# Patient Record
Sex: FEMALE | Race: WHITE | NOT HISPANIC OR LATINO | Employment: UNEMPLOYED | URBAN - METROPOLITAN AREA
[De-identification: names, ages, dates, MRNs, and addresses within clinical notes are randomized per-mention and may not be internally consistent; named-entity substitution may affect disease eponyms.]

---

## 2021-08-25 ENCOUNTER — HOSPITAL ENCOUNTER (OUTPATIENT)
Facility: MEDICAL CENTER | Age: 41
End: 2021-08-25
Attending: PHYSICIAN ASSISTANT
Payer: COMMERCIAL

## 2021-08-25 ENCOUNTER — OFFICE VISIT (OUTPATIENT)
Dept: URGENT CARE | Facility: CLINIC | Age: 41
End: 2021-08-25
Payer: COMMERCIAL

## 2021-08-25 VITALS
TEMPERATURE: 98.4 F | HEIGHT: 59 IN | BODY MASS INDEX: 23.59 KG/M2 | DIASTOLIC BLOOD PRESSURE: 74 MMHG | OXYGEN SATURATION: 98 % | WEIGHT: 117 LBS | SYSTOLIC BLOOD PRESSURE: 100 MMHG | HEART RATE: 88 BPM | RESPIRATION RATE: 16 BRPM

## 2021-08-25 DIAGNOSIS — N30.00 ACUTE CYSTITIS WITHOUT HEMATURIA: ICD-10-CM

## 2021-08-25 DIAGNOSIS — N89.8 VAGINAL DISCHARGE: ICD-10-CM

## 2021-08-25 DIAGNOSIS — R30.0 DYSURIA: ICD-10-CM

## 2021-08-25 LAB
APPEARANCE UR: CLEAR
BILIRUB UR STRIP-MCNC: NEGATIVE MG/DL
COLOR UR AUTO: YELLOW
GLUCOSE UR STRIP.AUTO-MCNC: NEGATIVE MG/DL
INT CON NEG: NEGATIVE
INT CON POS: POSITIVE
KETONES UR STRIP.AUTO-MCNC: NEGATIVE MG/DL
LEUKOCYTE ESTERASE UR QL STRIP.AUTO: NEGATIVE
NITRITE UR QL STRIP.AUTO: NEGATIVE
PH UR STRIP.AUTO: 6 [PH] (ref 5–8)
POC URINE PREGNANCY TEST: NEGATIVE
PROT UR QL STRIP: NEGATIVE MG/DL
RBC UR QL AUTO: NORMAL
SP GR UR STRIP.AUTO: 1.01
UROBILINOGEN UR STRIP-MCNC: 0.2 MG/DL

## 2021-08-25 PROCEDURE — 99204 OFFICE O/P NEW MOD 45 MIN: CPT | Performed by: PHYSICIAN ASSISTANT

## 2021-08-25 PROCEDURE — 81002 URINALYSIS NONAUTO W/O SCOPE: CPT | Performed by: PHYSICIAN ASSISTANT

## 2021-08-25 PROCEDURE — 87491 CHLMYD TRACH DNA AMP PROBE: CPT

## 2021-08-25 PROCEDURE — 87480 CANDIDA DNA DIR PROBE: CPT

## 2021-08-25 PROCEDURE — 87510 GARDNER VAG DNA DIR PROBE: CPT

## 2021-08-25 PROCEDURE — 81025 URINE PREGNANCY TEST: CPT | Performed by: PHYSICIAN ASSISTANT

## 2021-08-25 PROCEDURE — 87660 TRICHOMONAS VAGIN DIR PROBE: CPT

## 2021-08-25 PROCEDURE — 87591 N.GONORRHOEAE DNA AMP PROB: CPT

## 2021-08-25 RX ORDER — NITROFURANTOIN 25; 75 MG/1; MG/1
100 CAPSULE ORAL EVERY 12 HOURS
Qty: 10 CAPSULE | Refills: 0 | Status: SHIPPED | OUTPATIENT
Start: 2021-08-25 | End: 2021-08-30

## 2021-08-25 ASSESSMENT — ENCOUNTER SYMPTOMS
NAUSEA: 0
RESPIRATORY NEGATIVE: 1
CHILLS: 0
ABDOMINAL PAIN: 1
DIARRHEA: 0
CARDIOVASCULAR NEGATIVE: 1
FLANK PAIN: 0
FEVER: 0
VOMITING: 0
HEADACHES: 1
MYALGIAS: 0

## 2021-08-25 NOTE — LETTER
August 25, 2021         Patient: Gisele Jenkins   YOB: 1980   Date of Visit: 8/25/2021           To Whom it May Concern:    Gisele Jenkins was seen in my clinic on 8/25/2021.  Please excuse any absences related to this appointment.    If you have any questions or concerns, please don't hesitate to call.        Sincerely,           Sung Carreon P.A.-C.  Electronically Signed

## 2021-08-25 NOTE — LETTER
August 25, 2021         Patient: Gisele Jenkins   YOB: 1980   Date of Visit: 8/25/2021           To Whom it May Concern:    Gisele Jenkins was seen in my clinic on 8/25/2021. Please excuse her absence.    If you have any questions or concerns, please don't hesitate to call.        Sincerely,           Sung Carreon P.A.-C.  Electronically Signed

## 2021-08-25 NOTE — PROGRESS NOTES
"Subjective     Gisele Jenkins is a 41 y.o. female who presents with UTI (x3days, abdominal pain, flank pain, burning with urination)            Dysuria   This is a new problem. The current episode started in the past 7 days. The problem occurs every urination. The problem has been gradually worsening. The quality of the pain is described as burning. There has been no fever. The fever has been present for less than 1 day. There is no history of pyelonephritis. Associated symptoms include a discharge, frequency and urgency. Pertinent negatives include no chills, flank pain, hematuria, nausea or vomiting. She has tried NSAIDs for the symptoms. The treatment provided mild relief. There is no history of recurrent UTIs.       PMH:  has no past medical history on file.  MEDS: No current outpatient medications on file.  ALLERGIES: No Known Allergies  SURGHX: No past surgical history on file.  SOCHX:  reports that she has never smoked. She has never used smokeless tobacco.  FH: family history is not on file.    Review of Systems   Constitutional: Negative for chills and fever.   Respiratory: Negative.    Cardiovascular: Negative.    Gastrointestinal: Positive for abdominal pain. Negative for diarrhea, nausea and vomiting.   Genitourinary: Positive for dysuria, frequency and urgency. Negative for flank pain and hematuria.   Musculoskeletal: Negative for myalgias.   Neurological: Positive for headaches.       Medications, Allergies, and current problem list reviewed today in Epic           Objective     /74   Pulse 88   Temp 36.9 °C (98.4 °F) (Temporal)   Resp 16   Ht 1.5 m (4' 11.06\")   Wt 53.1 kg (117 lb)   SpO2 98%   BMI 23.59 kg/m²      Physical Exam  Vitals and nursing note reviewed.   Constitutional:       General: She is not in acute distress.     Appearance: She is well-developed. She is not diaphoretic.   HENT:      Head: Normocephalic and atraumatic.   Eyes:      Conjunctiva/sclera: Conjunctivae " normal.   Cardiovascular:      Rate and Rhythm: Normal rate and regular rhythm.      Heart sounds: Normal heart sounds.   Pulmonary:      Effort: Pulmonary effort is normal. No respiratory distress.      Breath sounds: Normal breath sounds. No wheezing, rhonchi or rales.   Abdominal:      General: Abdomen is flat. There is no distension.      Tenderness: There is abdominal tenderness (Suprapubic pressure). There is no right CVA tenderness, left CVA tenderness, guarding or rebound.   Genitourinary:     Comments: Defer.  Will self swab  Musculoskeletal:      Cervical back: Normal range of motion and neck supple.      Left lower leg: No edema.   Skin:     General: Skin is warm and dry.   Neurological:      Mental Status: She is alert and oriented to person, place, and time.   Psychiatric:         Behavior: Behavior normal.         Thought Content: Thought content normal.         Judgment: Judgment normal.                     Assessment & Plan         1. Vaginal discharge  VAGINAL PATHOGENS DNA PANEL    CHLAMYDIA/GC PCR URINE OR SWAB   2. Acute cystitis without hematuria  nitrofurantoin (MACROBID) 100 MG Cap    CANCELED: Urine Culture   3. Dysuria  POCT Urinalysis    POCT PREGNANCY     Dysuria, frequency, urgency.  Denies fever, vomiting, flank pain.  Some lower abdominal pain.  Also having vaginal discharge however no itching or irritation.  Vital signs normal.  Exam shows suprapubic pressure without rebound or guarding.  No CVA tenderness patient be treated for acute UTI while we await the results of her vaginal testing.  OTC meds and conservative measures as discussed    Return to clinic or go to ED if symptoms worsen or persist. Indications for ED discussed at length. Patient/Parent/Guardian voices understanding. Follow-up with your primary care provider in 3-5 days. Red flag symptoms discussed. All side effects of medication discussed including allergic response, GI upset, tendon injury, rash, sedation  etc.    Please note that this dictation was created using voice recognition software. I have made every reasonable attempt to correct obvious errors, but I expect that there are errors of grammar and possibly content that I did not discover before finalizing the note.

## 2021-08-26 DIAGNOSIS — N89.8 VAGINAL DISCHARGE: ICD-10-CM

## 2021-08-26 DIAGNOSIS — B96.89 BV (BACTERIAL VAGINOSIS): ICD-10-CM

## 2021-08-26 DIAGNOSIS — N76.0 BV (BACTERIAL VAGINOSIS): ICD-10-CM

## 2021-08-26 LAB
C TRACH DNA SPEC QL NAA+PROBE: NEGATIVE
CANDIDA DNA VAG QL PROBE+SIG AMP: NEGATIVE
G VAGINALIS DNA VAG QL PROBE+SIG AMP: POSITIVE
N GONORRHOEA DNA SPEC QL NAA+PROBE: NEGATIVE
SPECIMEN SOURCE: NORMAL
T VAGINALIS DNA VAG QL PROBE+SIG AMP: NEGATIVE

## 2021-08-26 RX ORDER — METRONIDAZOLE 500 MG/1
500 TABLET ORAL 2 TIMES DAILY
Qty: 14 TABLET | Refills: 0 | Status: SHIPPED | OUTPATIENT
Start: 2021-08-26

## 2021-09-26 ENCOUNTER — OCCUPATIONAL MEDICINE (OUTPATIENT)
Dept: URGENT CARE | Facility: CLINIC | Age: 41
End: 2021-09-26
Payer: COMMERCIAL

## 2021-09-26 VITALS
WEIGHT: 127 LBS | OXYGEN SATURATION: 99 % | DIASTOLIC BLOOD PRESSURE: 78 MMHG | RESPIRATION RATE: 16 BRPM | HEIGHT: 57 IN | BODY MASS INDEX: 27.4 KG/M2 | TEMPERATURE: 97.4 F | SYSTOLIC BLOOD PRESSURE: 120 MMHG | HEART RATE: 72 BPM

## 2021-09-26 DIAGNOSIS — S46.912A MUSCLE STRAIN OF LEFT SHOULDER REGION, INITIAL ENCOUNTER: ICD-10-CM
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
## 2021-09-26 DIAGNOSIS — S16.1XXA STRAIN OF FASCIA OF NECK: ICD-10-CM

## 2021-09-26 DIAGNOSIS — S46.911A MUSCLE STRAIN OF SHOULDER REGION, RIGHT, INITIAL ENCOUNTER: ICD-10-CM

## 2021-09-26 LAB
AMP AMPHETAMINE: NORMAL
BAR BARBITURATES: NORMAL
BREATH ALCOHOL COMMENT: NORMAL
BZO BENZODIAZEPINES: NORMAL
COC COCAINE: NORMAL
INT CON NEG: NORMAL
INT CON POS: NORMAL
MDMA ECSTASY: NORMAL
MET METHAMPHETAMINES: NORMAL
MTD METHADONE: NORMAL
OPI OPIATES: NORMAL
OXY OXYCODONE: NORMAL
PCP PHENCYCLIDINE: NORMAL
POC BREATHALIZER: 0 PERCENT (ref 0–0.01)
POC URINE DRUG SCREEN OCDRS: NEGATIVE
THC: NORMAL

## 2021-09-26 PROCEDURE — 99203 OFFICE O/P NEW LOW 30 MIN: CPT | Performed by: NURSE PRACTITIONER

## 2021-09-26 PROCEDURE — 82075 ASSAY OF BREATH ETHANOL: CPT | Performed by: NURSE PRACTITIONER

## 2021-09-26 PROCEDURE — 80305 DRUG TEST PRSMV DIR OPT OBS: CPT | Performed by: NURSE PRACTITIONER

## 2021-09-26 RX ORDER — KETOROLAC TROMETHAMINE 30 MG/ML
30 INJECTION, SOLUTION INTRAMUSCULAR; INTRAVENOUS ONCE
Status: COMPLETED | OUTPATIENT
Start: 2021-09-26 | End: 2021-09-26

## 2021-09-26 RX ADMIN — KETOROLAC TROMETHAMINE 30 MG: 30 INJECTION, SOLUTION INTRAMUSCULAR; INTRAVENOUS at 18:17

## 2021-09-26 ASSESSMENT — ENCOUNTER SYMPTOMS
FALLS: 0
CHILLS: 1
NAUSEA: 0
VOMITING: 0
TINGLING: 0
SENSORY CHANGE: 0
FOCAL WEAKNESS: 1
ABDOMINAL PAIN: 0
BACK PAIN: 0
MYALGIAS: 1
NECK PAIN: 1
DIARRHEA: 0

## 2021-09-26 NOTE — LETTER
September 26, 2021    To Whom It May Concern:         This is confirmation that Gisele Giraldo attended her scheduled appointment with OMARI Goodman on 9/26/21. Please excuse her absence from work on 9/27/2021 due to an acute injury.          If you have any questions please do not hesitate to call me at the phone number listed below.    Sincerely,          LUCIO Goodman.  [phone]

## 2021-09-26 NOTE — LETTER
[place] [address] [place] [address]  Phone:  [phone] - Fax:  [phone]   [facility] Progress Report and Disability Certification  Date of Service: 9/26/2021   No Show:  No  Date / Time of Next Visit: 9/29/2021 @ 9 am   Claim Information   Patient Name: Gisele Giraldo  Claim Number:     Employer: [employer]  Date of Injury: 9/26/2021     Insurer / TPA: [employer]  ID / SSN:     Occupation: House keeping  Diagnosis: Diagnoses of Muscle strain of left shoulder region, initial encounter, Muscle strain of shoulder region, right, initial encounter, Work related injury, and Strain of fascia of neck were pertinent to this visit.    Medical Information   Related to Industrial Injury? Yes    Subjective Complaints:  Neck Pain   This is a new (DOI 9/26/2021) problem. 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 She notes her pain occurred while spreading a sheet while making a bed. Her pain progressively worsened throughout the day. ). The problem has been unchanged. The quality of the pain is described as aching. The pain is severe (She notes worsened pain with sitting up straight and standing). Pertinent negatives include no tingling. Associated symptoms comments: Radiating pain to neck. She has tried ice and NSAIDs for the symptoms. The treatment provided moderate relief.   She denies any other employment. She did have an injury to a tendon in her left upper back 3 years ago. This does not feel similar to her previous injury. She does continue to have pain from this previous injury that she medicates with Ibuprofen when needed.    Objective Findings: Musculoskeletal:      Cervical back: Neck supple. No signs of trauma, torticollis or tenderness. Pain with movement, spinous process tenderness and muscular tenderness present. Decreased range of motion.   Bilateral  upper extremity strength 4/5.   Pre-Existing Condition(s): Previous left upper back strain 3 years ago.   Assessment:   Initial Visit    Status: Additional Care Required  Permanent Disability:No    Plan: Medication  Comments:Toradol, Tylenol    Diagnostics:      Comments:       Disability Information   Status:      From:  9/26/2021  Through: 9/29/2021 Restrictions are: Temporary   Physical Restrictions   Sitting:    Standing:    Stooping:    Bending:      Squatting:    Walking:    Climbing:    Pushing:      Pulling:    Other:    Reaching Above Shoulder (L):   Reaching Above Shoulder (R):       Reaching Below Shoulder (L):    Reaching Below Shoulder (R):      Not to exceed Weight Limits   Carrying(hrs):   Weight Limit(lb):   Lifting(hrs):   Weight  Limit(lb):     Comments: Toradol injection given in clinic today.  She was educated to refrain from additional NSAIDs for the next 48 hours following the use of this medication.  Tylenol may be used every 4 hours as needed for additional pain relief.  We discussed the importance of massage, heat, ice and rest.  Due to her severe pain, she will take tomorrow off work and follow-up in the clinic in 3 days.  No work restrictions provided at today's visit but she does not return to work until Thursday.  AVS handout given and reviewed with patient. Pt educated on red flags and when to seek treatment back in ER or UC.     Repetitive Actions   Hands: i.e. Fine Manipulations from Grasping:     Feet: i.e. Operating Foot Controls:     Driving / Operate Machinery:     Health Care Provider’s Original or Electronic Signature  OMARI Goodman Health Care Provider’s Original or Electronic Signature    David Orozco MD         Clinic Name / Location: [address] [place] [address] Clinic Phone Number: Dept: [phone]   Appointment Time: 3:15 Pm Visit Start Time: 5:47 PM   Check-In Time:  3:16 Pm Visit Discharge Time:  629 pm     Original-Treating Physician or Chiropractor    Page 2-Insurer/TPA    Page 3-Employer    Page 4-Employee

## 2021-09-26 NOTE — LETTER
"EMPLOYEE’S CLAIM FOR COMPENSATION/ REPORT OF INITIAL TREATMENT  FORM C-4    EMPLOYEE’S CLAIM - PROVIDE ALL INFORMATION REQUESTED   First Name  Gisele Last Name  Enzo Birthdate                    5/22/1979                Sex  female Claim Number (Insurer’s Use Only)    Home Address  [address] Age  42 y.o. 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 (if applicable)  zo abdalla aura cama    How did this injury or occupational disease occur? (Be specific an answer in detail. Use additional sheet if necessary)  Poniendo rm montgomerya cama de un cuarto   If you believe that you have an occupational disease, when did you first have knowledge of the disability and it relationship to your employment?  n/a Witnesses to the Accident  n/a      Nature of Injury or Occupational Disease  Sprain  Part(s) of Body Injured or Affected  Soft Tissue - Neck, ,     I certify that the above is true and correct to the best of my knowledge and that I have provided this information in order to obtain the " benefits of [place]  I hereby authorize any physician, chiropractor, surgeon, practitioner, or other person, any hospital, including [facility] or [facility], any medical service organization, any insurance company, or other institution or organization to release to each other, any medical or other information, including benefits paid or payable, pertinent to this injury or disease, except information relative to diagnosis, treatment and/or counseling for AIDS, psychological conditions, alcohol or controlled substances, for which I must give specific authorization.  A Photostat of this authorization shall be as valid as the original.     Date   Place Employee’s Original or  *Electronic Signature   THIS REPORT MUST BE COMPLETED AND MAILED WITHIN 3 WORKING DAYS OF TREATMENT   Place  [facility]  Name of [facility]   Date  9/26/2021 Diagnosis and Description of Injury or Occupational Disease  (S46.912A) Muscle strain of left shoulder region, initial encounter  (S46.911A) Muscle strain of shoulder region, right, initial encounter  (Y99.0) Work related injury  (S16.1XXA) Strain of fascia of neck Is there evidence the injured employee was under the influence of alcohol and/or another controlled substance at the time of accident?  ? No ? Yes (if yes, please explain)    Hour  5:47 PM   Diagnoses of Muscle strain of left shoulder region, initial encounter, Muscle strain of shoulder region, right, initial encounter, Work related injury, and Strain of fascia of neck were pertinent to this visit. No   Treatment  Rest, ice, heat, massage and over-the-counter Tylenol.   Have you advised the patient to remain off work five days or     more?    X-Ray Findings      ? Yes Indicate dates:   From   To      From information given by the employee, together with medical evidence, can         "you directly connect this injury or occupational disease as job incurred?  Yes  Comments:Unknown ? No If no, is the injured employee capable of:  ? full duty  No ? modified duty  No   Is additional medical care by a physician indicated?  Yes If Modified Duty, Specify any Limitations / Restrictions      Do you know of any previous injury or disease contributing to this condition or occupational disease?  ? Yes ? No (Explain if yes)                          Yes  Comments:Previous left upper back strain   Date  9/26/2021 Print Health Care Provider's   OMARI Goodman I certify the employer’s copy of  this form was mailed on:   Address  [address] [address] Insurer’s Use Only     [facility] Zip  [address]    Provider’s Tax ID Number  [id number] Telephone  Dept: [phone]             Health Care Provider’s Original or Electronic Signature  e-REINA Mccormick Degree (MD,DO, DC,PA-C,APRN)   APRN      * Complete and attach Release of Information (Form C-4A) when injured employee signs C-4 Form electronically  ORIGINAL - TREATING HEALTHCARE PROVIDER PAGE 2 - INSURER/TPA PAGE 3 - EMPLOYER PAGE 4 - EMPLOYEE             Form C-4 (rev.08/21)           BRIEF DESCRIPTION OF RIGHTS AND BENEFITS  (Pursuant to [place] 616C.050)    Notice of Injury or Occupational Disease (Incident Report Form C-1): If an injury or occupational disease (OD) arises out of and in the course of employment, you must provide written notice to your employer as soon as practicable, but no later than 7 days after the accident or OD. Your employer shall maintain a sufficient supply of the required forms.    Claim for Compensation (Form C-4): If medical treatment is sought, the form C-4 is available at the place of initial treatment. A completed \"Claim for Compensation\" (Form C-4) must be filed within 90 days after an accident or OD. The treating physician or chiropractor must, within 3 working days after treatment, complete and " mail to the employer, the employer's insurer and third-party , the Claim for Compensation.    Medical Treatment: If you require medical treatment for your on-the-job injury or OD, you may be required to select a physician or chiropractor from a list provided by your workers’ compensation insurer, if it has contracted with an Organization for Managed Care (MCO) or Preferred Provider Organization (PPO) or providers of health care. If your employer has not entered into a contract with an MCO or PPO, you may select a physician or chiropractor from the Panel of Physicians and Chiropractors. Any medical costs related to your industrial injury or OD will be paid by your insurer.    Temporary Total Disability (TTD): If your doctor has certified that you are unable to work for a period of at least 5 consecutive days, or 5 cumulative days in a 20-day period, or places restrictions on you that your employer does not accommodate, you may be entitled to TTD compensation.    Temporary Partial Disability (TPD): If the wage you receive upon reemployment is less than the compensation for TTD to which you are entitled, the insurer may be required to pay you TPD compensation to make up the difference. TPD can only be paid for a maximum of 24 months.    Permanent Partial Disability (PPD): When your medical condition is stable and there is an indication of a PPD as a result of your injury or OD, within 30 days, your insurer must arrange for an evaluation by a rating physician or chiropractor to determine the degree of your PPD. The amount of your PPD award depends on the date of injury, the results of the PPD evaluation, your age and wage.    Permanent Total Disability (PTD): If you are medically certified by a treating physician or chiropractor as permanently and totally disabled and have been granted a PTD status by your insurer, you are entitled to receive monthly benefits not to exceed 66 2/3% of your average monthly  wage. The amount of your PTD payments is subject to reduction if you previously received a lump-sum PPD award.    Vocational Rehabilitation Services: You may be eligible for vocational rehabilitation services if you are unable to return to the job due to a permanent physical impairment or permanent restrictions as a result of your injury or occupational disease.    Transportation and Per Heber Reimbursement: You may be eligible for travel expenses and per heber associated with medical treatment.    Reopening: You may be able to reopen your claim if your condition worsens after claim closure.     Appeal Process: If you disagree with a written determination issued by the insurer or the insurer does not respond to your request, you may appeal to the Department of Administration, , by following the instructions contained in your determination letter. You must appeal the determination within 70 days from the date of the determination letter at [address], [address], [place], [place] [address], or [address] [place], [place] [address]. If you disagree with the  decision, you may appeal to the Department of Administration, . You must file your appeal within 30 days from the date of the  decision letter at [address], [address], [place], [place] [address], or [address] [place], [place] [address]. If you disagree with a decision of an , you may file a petition for judicial review with the District Court. You must do so within 30 days of the Appeal Officer’s decision. You may be represented by an  at your own expense or you may contact the [facility] for possible representation.    [place]  for Injured Workers (NAIW): If you disagree with a  decision, you may request that NAIW represent you without charge at an  Hearing. For information regarding denial of  benefits, you may contact the [facility] at: [address], [address], [place] [address], [phone]) [phone], or [address] [facility], [address], [place] [address], [phone]    To File a Complaint with the Division: If you wish to file a complaint with the  of the Division of Industrial Relations (DIR),  please contact the Workers’ Compensation Section, [address], [address], [place], [place] [address], telephone ([phone]) [phone], or [address], [place], [place] [address], telephone [phone].    For assistance with Workers’ Compensation Issues: You may contact the [facility] Office for Consumer Health Assistance, [address], [address], [place], [place] [address], Toll Free 1-575.250.1137, Web site: http://[facility].nv.[facility]/Programs/KATHIE E-mail: [email or web address].[email or web address]              __________________________________________________________________                                    _________________            Employee Name / Signature                                                                                                                            Date                                                                                                                                                                                                                              D-2 (rev. 10/20)

## 2021-09-27 NOTE — PATIENT INSTRUCTIONS
Ketorolac injection  ¿Qué es kaden medicamento?  El QUETOROLAC es un medicamento antiinflamatorio no esteroideo (LUZ). Se utiliza para tratar los lana moderados a severos hasta 5 días. Se utiliza comúnmente después de viki cirugía. No debe utilizarlo alessandro más de 5 días.  Kaden medicamento puede ser utilizado para otros usos; si tiene alguna pregunta consulte con amin proveedor de atención médica o con amin farmacéutico.  MARCAS COMUNES: Toradol  ¿Qué le wild informar a mi profesional de la uma antes de noelle kaden medicamento?  Necesitan saber si usted presenta alguno de los siguientes problemas o situaciones:  asma, especialmente asma sensible a la aspirina problemas de sangrado cirugía de bypass coronario con injerto en las últimas 2 semanas enfermedad renal sangrado estomacal, úlcera, u otro problema anthony aspirina, otros LUZ, o probenecida viki reacción alérgica o inusual al ketorolaco, a la trometamina, a la aspirina, a otros LUZ, a otros medicamentos, alimentos, colorantes o conservantes si está embarazada o buscando quedar embarazada si está amamantando a un bebé  ¿Cómo wild utilizar kaden medicamento?  Kaden medicamento se administra mediante inyección en un músculo o en viki vena. Lo administra un profesional de la uma en un hospital o en un entorno clínico.  Hable con amin pediatra para informarse acerca del uso de kaden medicamento en niños. Puede requerir atención especial.  Los pacientes de más de 65 años de edad pueden presentar reacciones más barbara y necesitar dosis menores.  Sobredosis: Póngase en contacto inmediatamente con un centro toxicológico o viki charles de urgencia si usted du que haya tomado demasiado medicamento.  ATENCIÓN: Kaden medicamento es solo para usted. No comparta kaden medicamento con nadie.  ¿Qué sucede si me olvido de viki dosis?  No se aplica en kaden nga.  ¿Qué puede interactuar con kaden medicamento?  No tome esta medicina con ninguno de los siguientes medicamentos:  · aspirina y  medicamentos tipo aspirina  · cidofovir  · metotrexato  · los LUZ, medicamentos para el dolor o inflamación, jeferson ibuprofeno o naproxeno  · pentoxifilina  · probenecid  Esta medicina también puede interactuar con los siguientes medicamentos:  · alcohol  · alendronato  · alprazolam  · carbamazepina  · diuréticos  · flavocoxid  · fluoxetina  · ginkgo  · litio  · medicamentos para la presión sanguínea jeferson enalapril  · medicamentos que afectan las plaquetas jeferson pentoxifilina  · medicamentos que tratan o previenen coágulos sanguíneos, jeferson heparina, warfarina  · relajantes musculares  · pemetrexed  · fenitoína  · tiotixeno  Puede ser que esta lista no menciona todas las posibles interacciones. Informe a amin profesional de la uma de todos los productos a base de hierbas, medicamentos de venta jeannine o suplementos nutritivos que esté tomando. Si usted fuma, consume bebidas alcohólicas o si utiliza drogas ilegales, indíqueselo también a amin profesional de la uma. Algunas sustancias pueden interactuar con amin medicamento.  ¿A qué wild estar atento al usar kaden medicamento?  Si jimena síntomas no mejoran o si se siente peor, informe a amin médico o a amin profesional de la uma.  Kaden medicamento no previene ataques cardíacos o derrames cerebrales. De hecho, kaden medicamento puede aumentar la posibilidad de padecer un ataque cardiaco o un derrame cerebral. La posibilidad puede aumentar con el uso prolongado de kaden medicamento y en pacientes con enfermedad cardiaca. Si está tomando aspirina para la prevención de ataques cardíacos o derrames cerebrales, comuníquese con amin médico o amin profesional de la uma.  Evite noelle medicamentos, tales jeferson ibuprofeno y naproxeno con kaden medicamento. Es probable que se produzcan efectos secundarios, tales jeferson molestias estomacales, náuseas o úlceras. No debe de noelle kaden medicamento con muchos medicamentos disponibles de venta jeannine.  Kaden medicamento puede provocar úlceras y hemorragia  del estómago e intestinos en cualquier momento alessandro tratamiento. No fume ni ingiera alcohol. [place] irrita aún más el estómago y puede hacerlo más susceptible a daño por el uso de kaden medicamento. Pueden ocurrir úlceras y hemorragia sin síntomas de alerta y pueden provocar la muerte.  Kaden medicamento puede hacerle sangrar con mayor facilidad. Trate de no lastimarse los dientes y las encías al cepillarlos o limpiarlos con hilo dental.  ¿Qué efectos secundarios puedo tener al utilizar kaden medicamento?  Efectos secundarios que debe informar a amin médico o a amin profesional de la uma tan pronto jeferson sea posible:  reacciones alérgicas, jeferson erupción cutánea, comezón/picazón o urticarias, e hinchazón de la mary jane, los labios o la lengua problemas respiratorios presión sanguínea josep náuseas, vómito enrojecimiento, formación de ampollas, descamación o distensión de la piel, incluso dentro de la boca dolor estomacal severo signos y síntomas de sangrado, tales jeferson heces con jannet o de color jimenez y aspecto alquitranado; orina de color aquino o marrón oscuro; escupir jannet o material marrón que tiene el aspecto de granos de café molido; manchas pollock en la piel; sangrado o moretones inusuales en los ojos, las encías o la nariz signos y síntomas de un coágulo sanguíneo, tales jeferson cambios en la visión; dolor en el pecho; dolor de cathleen severo, repentino; dificultad para hablar; entumecimiento o debilidad repentina de la mary jane, el brazo o la pierna dificultad para orinar o cambios en el volumen de orina aumento de peso o hinchazón inexplicables cansancio o debilidad inusual color amarillento de los ojos o la piel  Efectos secundarios que generalmente no requieren atención médica (infórmelos a amin médico o a amin profesional de la uma si persisten o si son molestos):  diarrea mareos dolor de cathleen acidez estomacal  Puede ser que esta lista no menciona todos los posibles efectos secundarios. Comuníquese a amin médico por  asesoramiento médico sobre los efectos secundarios. Usted puede informar los efectos secundarios a la FDA por teléfono al 1-800-FDA-1088.  ¿Dónde wild guardar mi medicina?  [place] medicamento se administra en hospitales o clínicas y no necesitará guardarlo en amin domicilio.  ATENCIÓN: [place] folleto es un resumen. Puede ser que no cubra toda la posible información. Si usted tiene preguntas acerca de esta medicina, consulte con amin médico, amin farmacéutico o amin profesional de la uma.  © 2020 Elsevier[employer] (2018-10-11 00:00:00)  Distensión muscular.  Muscle Strain  Eda distensión muscular es eda lesión que se produce cuando un músculo se estira más allá de amin carina normal. Cuando esto sucede, por lo general, se desgarra eda pequeña cantidad de fibras musculares. Hay marleny tipos de distensiones musculares. Las distensiones de primer franca son aquellas en las cuales el desgarro afecta a la rocael cantidad de fibras musculares y las menos dolorosas. Las distensiones de milan y tercer franca involucran eda proporción cada vez mayor de desgarro y dolor.  En general, la recuperación de eda distensión muscular tarda de 1 a 2 semanas. La recuperación completa normalmente tarda de 5 a 6 semanas.  ¿Cuáles son las causas?  Esta afección ocurre cuando se aplica eda fuerza violenta y súbita sobre un músculo y éste se estira demasiado. [place] puede ocurrir alessandro eda caída, cuando se levantan objetos o cuando se practican deportes.  ¿Qué incrementa el riesgo?  Es más probable que esta afección se manifieste en atletas y personas físicamente activas.  ¿Cuáles son los signos o los síntomas?  Los síntomas de esta afección incluyen los siguientes:  · Dolor.  · Moretones.  · Hinchazón.  · Dificultad cuando se usa el músculo.  ¿Cómo se diagnostica?  Esta afección se diagnostica en función de un examen físico y de los antecedentes médicos. También se pueden hacer estudios jeferson radiografía, ecografía o resonancia magnética (RM).  ¿Cómo  se trata?  Inicialmente, se trata con terapia PRICE (protección, reposo, hielo, compresión, elevación). Esta terapia incluye lo siguiente:  · Proteger al músculo de nuevas lesiones.  · Reposo del músculo lesionado.  · Aplicación de hielo en el músculo lesionado.  · Aplicación de presión (compresión) en el músculo lesionado. [place] se puede hacer con viki férula o viki venda elástica.  · Elevación del músculo lesionado.  El médico también puede recomendarle analgésicos.  Siga estas indicaciones en amin casa:  Si tiene viki férula:  · Use la férula jeferson se lo haya indicado el médico. Quítesela solamente jeferson se lo haya indicado el médico.  · Afloje la férula si los dedos de las shell o de los pies se le entumecen, siente hormigueos o se le enfrían y se tornan de color amanda.  · Mantenga la férula limpia.  · Si la férula no es impermeable:  ? No deje que se moje.  ? Cúbrala con un envoltorio hermético cuando tome un baño de inmersión o viki ducha.  Control del dolor, de la rigidez y de la hinchazón    · Si se lo indican, aplique hielo sobre la flynn de la lesión.  ? Si tiene viki férula desmontable, quítesela jeferson se lo haya indicado el médico.  ? Ponga el hielo en viki bolsa plástica.  ? Coloque viki toalla entre la piel y la bolsa de hielo.  ? Coloque el hielo alessandro 20 minutos, de 2 a 3 veces por día.  · Mueva los dedos de la mano o del pie con frecuencia para evitar la rigidez y reducir la hinchazón.  · Cuando esté sentado o acostado, levante (eleve) la flynn lesionada por encima del nivel del corazón.  · Use viki venda elástica jeferson se lo haya indicado el médico. Asegúrese de no ajustarla demasiado.  Instrucciones generales  · Charlo los medicamentos de venta jeannine y los recetados solamente jeferson se lo haya indicado el médico.  · Restrinja las actividades y tesfaye reposo del músculo lesionado según las indicaciones del médico. Posiblemente le permitan hacer movimientos suaves.  · Si le indicaron fisioterapia, tesfaye los ejercicios  jeferson se lo haya indicado el médico.  · No ejerza presión en ninguna parte de la férula hasta que se haya endurecido por completo. [place] puede tardar varias horas.  · No consuma ningún producto que contenga nicotina o tabaco, jeferson cigarrillos y cigarrillos electrónicos. Estos pueden retrasar la consolidación del hueso. Si necesita ayuda para dejar de fumar, consulte al médico.  · Pregúntele al médico cuándo puede volver a conducir si tiene viki férula.  · Concurra a todas las visitas de control jeferson se lo haya indicado el médico. [place] es importante.  ¿Cómo se beny?  · Precaliente antes de la actividad física. [place] ayuda a prevenir futuras distensiones musculares.  Comuníquese con un médico si:  · Siente más dolor o tiene más hinchazón en la flynn lesionada.  Solicite ayuda de inmediato si:  · Siente adormecimiento, hormigueo o nota viki pérdida importante de fuerza en la flynn lesionada.  Resumen  · Viki distensión muscular es viki lesión que se produce cuando un músculo se estira más allá de amin carina normal.  · Esta afección ocurre cuando se aplica viki fuerza violenta y súbita sobre un músculo y éste se estira demasiado.  · Esta afección se trata inicialmente con terapia PRICE, que significa protección, reposo, hielo, compresión y elevación.  · Posiblemente le permitan hacer movimientos suaves. Si le indicaron fisioterapia, tesfaye los ejercicios jeferson se lo haya indicado el médico.  Esta información no tiene jeferson fin reemplazar el consejo del médico. Asegúrese de hacerle al médico cualquier pregunta que tenga.  Document Released: 09/27/2006 Document Revised: 09/17/2018 Document Reviewed: 09/17/2018  Elsevier Patient Education © 2020 Elsevier Inc.

## 2021-09-27 NOTE — PROGRESS NOTES
Subjective:     Gisele Giraldo is a 42 y.o. female who presents for Other (New WC DOI 09/26/2021, Pulling in shoulder muscle, as the day went forward pain got worse)      Neck Pain   This is a new (DOI 9/26/2021) problem. 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 She notes her pain occurred while spreading a sheet while making a bed. Her pain progressively worsened throughout the day. ). The problem has been unchanged. The quality of the pain is described as aching. The pain is severe (She notes worsened pain with sitting up straight and standing). Pertinent negatives include no tingling. Associated symptoms comments: Radiating pain to neck. She has tried ice and NSAIDs for the symptoms. The treatment provided moderate relief.   She denies any other employment. She did have an injury to a tendon in her left upper back 3 years ago. This does not feel similar to her previous injury. She does continue to have pain from this previous injury that she medicates with Ibuprofen when needed.       Review of Systems   Constitutional: Positive for chills.   Gastrointestinal: Negative for abdominal pain, diarrhea, nausea and vomiting.   Musculoskeletal: Positive for myalgias and neck pain. Negative for back pain, falls and joint pain.   Neurological: Positive for focal weakness. Negative for tingling and sensory change.       PMH: No past medical history on file.  ALLERGIES: No Known Allergies  SURGHX: No past surgical history on file.  SOCHX:   Social History     Socioeconomic History   • Marital status: Single     Spouse name: Not on file   • Number of children: Not on file   • Years of education: Not on file   • Highest education level: Not on file   Occupational History   • Not on file   Tobacco Use   • Smoking status: Never Smoker   • Smokeless tobacco: Never Used   Substance and Sexual Activity   • Alcohol use: Not on file   • Drug use: Not on file  "  • Sexual activity: Not on file   Other Topics Concern   • Not on file   Social History Narrative   • Not on file     Social Determinants of Health     Financial Resource Strain:    • Difficulty of Paying Living Expenses:    Food Insecurity:    • Worried About Running Out of Food in the Last Year:    • Ran Out of Food in the Last Year:    Transportation Needs:    • Lack of Transportation (Medical):    • Lack of Transportation (Non-Medical):    Physical Activity:    • Days of Exercise per Week:    • Minutes of Exercise per Session:    Stress:    • Feeling of Stress :    Social Connections:    • Frequency of Communication with Friends and Family:    • Frequency of Social Gatherings with Friends and Family:    • Attends Sabianist Services:    • Active Member of Clubs or Organizations:    • Attends Club or Organization Meetings:    • Marital Status:    Intimate Partner Violence:    • Fear of Current or Ex-Partner:    • Emotionally Abused:    • Physically Abused:    • Sexually Abused:      FH: No family history on file.      Objective:   /78   Pulse 72   Temp 36.3 °C (97.4 °F) (Temporal)   Resp 16   Ht 1.448 m (4' 9\")   Wt 57.6 kg (127 lb)   SpO2 99%   BMI 27.48 kg/m²     Physical Exam  Vitals and nursing note reviewed.   Constitutional:       General: She is not in acute distress.     Appearance: Normal appearance. She is not ill-appearing.   HENT:      Head: Normocephalic and atraumatic.      Right Ear: External ear normal.      Left Ear: External ear normal.      Nose: No congestion or rhinorrhea.      Mouth/Throat:      Mouth: Mucous membranes are moist.   Eyes:      Extraocular Movements: Extraocular movements intact.      Pupils: Pupils are equal, round, and reactive to light.   Neck:     Cardiovascular:      Rate and Rhythm: Normal rate and regular rhythm.      Pulses: Normal pulses.      Heart sounds: Normal heart sounds.   Pulmonary:      Effort: Pulmonary effort is normal.      Breath sounds: " Normal breath sounds.   Abdominal:      General: Abdomen is flat. Bowel sounds are normal.      Palpations: Abdomen is soft.      Tenderness: There is no abdominal tenderness. There is no right CVA tenderness or left CVA tenderness.   Musculoskeletal:      Cervical back: Neck supple. No signs of trauma, torticollis or tenderness. Pain with movement, spinous process tenderness and muscular tenderness present. Decreased range of motion.   Lymphadenopathy:      Cervical: No cervical adenopathy.   Skin:     General: Skin is warm and dry.      Capillary Refill: Capillary refill takes less than 2 seconds.   Neurological:      General: No focal deficit present.      Mental Status: She is alert and oriented to person, place, and time. Mental status is at baseline.   Psychiatric:         Mood and Affect: Mood normal.         Behavior: Behavior normal.         Thought Content: Thought content normal.         Judgment: Judgment normal.         Assessment/Plan:   Assessment    1. Muscle strain of left shoulder region, initial encounter  ketorolac (TORADOL) injection 30 mg   2. Muscle strain of shoulder region, right, initial encounter  ketorolac (TORADOL) injection 30 mg   3. Work related injury     4. Strain of fascia of neck       Toradol injection given in clinic today.  She tolerated this well.  She was educated to refrain from additional NSAIDs for the next 48 hours following the use of this medication.  Tylenol may be used every 4 hours as needed for additional pain relief.  We discussed the importance of massage, heat, ice and rest.  Due to her severe pain, she will take tomorrow off work and follow-up in the clinic in 3 days.  No work restrictions provided at today's visit but she does not return to work until Thursday.  AVS handout given and reviewed with patient. Pt educated on red flags and when to seek treatment back in ER or UC.

## 2021-09-29 ENCOUNTER — OCCUPATIONAL MEDICINE (OUTPATIENT)
Dept: URGENT CARE | Facility: CLINIC | Age: 41
End: 2021-09-29
Payer: COMMERCIAL

## 2021-09-29 VITALS
DIASTOLIC BLOOD PRESSURE: 62 MMHG | HEART RATE: 66 BPM | TEMPERATURE: 97 F | BODY MASS INDEX: 26.25 KG/M2 | OXYGEN SATURATION: 100 % | SYSTOLIC BLOOD PRESSURE: 102 MMHG | RESPIRATION RATE: 16 BRPM | HEIGHT: 59 IN | WEIGHT: 130.2 LBS

## 2021-09-29 DIAGNOSIS — S46.919D: ICD-10-CM

## 2021-09-29 DIAGNOSIS — S16.1XXD STRAIN OF NECK MUSCLE, SUBSEQUENT ENCOUNTER: ICD-10-CM

## 2021-09-29 PROCEDURE — 99213 OFFICE O/P EST LOW 20 MIN: CPT | Performed by: NURSE PRACTITIONER

## 2021-09-29 ASSESSMENT — ENCOUNTER SYMPTOMS
NECK PAIN: 1
NEUROLOGICAL NEGATIVE: 1
CONSTITUTIONAL NEGATIVE: 1

## 2021-09-29 ASSESSMENT — VISUAL ACUITY: OU: 1

## 2021-09-29 NOTE — PROGRESS NOTES
"Subjective:     Gisele Giraldo is a 42 y.o. female who presents for Follow-Up (WC, 09/26/2021)    Initial UC visit 9/26/2021 reviewed for continuity of care:    \"This is a new (DOI 9/26/2021) problem. 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 She notes her pain occurred while spreading a sheet while making a bed. Her pain progressively worsened throughout the day. ). The problem has been unchanged. The quality of the pain is described as aching. The pain is severe (She notes worsened pain with sitting up straight and standing). Pertinent negatives include no tingling. Associated symptoms comments: Radiating pain to neck. She has tried ice and NSAIDs for the symptoms. The treatment provided moderate relief.   She denies any other employment. She did have an injury to a tendon in her left upper back 3 years ago. This does not feel similar to her previous injury. She does continue to have pain from this previous injury that she medicates with Ibuprofen when needed. \"    Follow-up UC visit today 9/29/2021:    Polish translation provided by professional video conferencing service.  Patient reports that overall, symptoms are improving.  No longer has tenderness behind her neck.  Has mild pain when turning her head left or right.  Has been using acetaminophen and applying warm compresses.  Has not been back to work yet.  No other/new symptoms.    Patient was screened prior to rooming and denied COVID-19 diagnosis or contact with a person who has been diagnosed or is suspected to have COVID-19. During this visit, appropriate PPE was worn, hand hygiene was performed, and the patient and any visitors were masked.    PMH: No pertinent past medical history to this problem  MEDS: Medications were reviewed in Epic  ALLERGIES: Allergies were reviewed in Epic  SOCHX: Works as a  at [employer]   FH: No pertinent family history to this problem    Review " "of Systems   Constitutional: Negative.    Musculoskeletal: Positive for neck pain.   Neurological: Negative.    All other systems reviewed and are negative.    Additional details per HPI.      Objective:     /62 (BP Location: Left arm, Patient Position: Sitting, BP Cuff Size: Adult long)   Pulse 66   Temp 36.1 °C (97 °F) (Temporal)   Resp 16   Ht 1.5 m (4' 11.06\")   Wt 59.1 kg (130 lb 3.2 oz)   SpO2 100%   BMI 26.25 kg/m²     Physical Exam  Vitals reviewed.   Constitutional:       General: She is not in acute distress.     Appearance: She is well-developed. She is not ill-appearing or toxic-appearing.   HENT:      Head: Normocephalic.      Right Ear: External ear normal.      Left Ear: External ear normal.   Eyes:      General: Vision grossly intact.      Extraocular Movements: Extraocular movements intact.      Conjunctiva/sclera: Conjunctivae normal.   Cardiovascular:      Rate and Rhythm: Normal rate.   Pulmonary:      Effort: Pulmonary effort is normal. No respiratory distress.   Musculoskeletal:         General: No deformity.      Cervical back: No swelling, deformity, lacerations or tenderness. No spinous process tenderness or muscular tenderness. Decreased range of motion (With lateral rotation due to pain).   Skin:     General: Skin is warm and dry.      Coloration: Skin is not pale.   Neurological:      Mental Status: She is alert and oriented to person, place, and time.      Sensory: No sensory deficit.      Motor: No weakness.      Coordination: Coordination normal.   Psychiatric:         Behavior: Behavior normal. Behavior is cooperative.       Assessment/Plan:     1. Strain of neck muscle, subsequent encounter    2. Muscle strain of shoulder region, unspecified laterality, subsequent encounter    Condition improving.  May use gentle massage, stretching, and range of motion exercises as tolerated. May apply heat up to 20 minutes at a time. May use over-the-counter acetaminophen alternating " with ibuprofen, per 's instructions, for additional pain relief.  Work restrictions.  Follow-up in 5 days.  Seek immediate medical attention if symptoms change or worsen.    Differential diagnosis, natural history, supportive care, over-the-counter symptom management per 's instructions, close monitoring, and indications for immediate follow-up discussed.     All questions answered. Patient agrees with the plan of care.

## 2021-09-29 NOTE — LETTER
"   [facility]  [address] [facility] [address] [address]  Phone:  [phone] - Fax:  [phone]   [facility] Progress Report and Disability Certification  Date of Service: 9/29/2021   No Show:  No  Date / Time of Next Visit: 10/4/2021   Claim Information   Patient Name: Gisele Giraldo  Claim Number:     Employer: [employer]  Date of Injury: 9/26/2021     Insurer / TPA: [employer]  ID / SSN:     Occupation: House keeping  Diagnosis: Diagnoses of Strain of neck muscle, subsequent encounter and Muscle strain of shoulder region, unspecified laterality, subsequent encounter were pertinent to this visit.    Medical Information   Related to Industrial Injury? Yes    Subjective Complaints:  Initial UC visit 9/26/2021 reviewed for continuity of care:    \"This is a new (DOI 9/26/2021) problem. 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 She notes her pain occurred while spreading a sheet while making a bed. Her pain progressively worsened throughout the day. ). The problem has been unchanged. The quality of the pain is described as aching. The pain is severe (She notes worsened pain with sitting up straight and standing). Pertinent negatives include no tingling. Associated symptoms comments: Radiating pain to neck. She has tried ice and NSAIDs for the symptoms. The treatment provided moderate relief.   She denies any other employment. She did have an injury to a tendon in her left upper back 3 years ago. This does not feel similar to her previous injury. She does continue to have pain from this previous injury that she medicates with Ibuprofen when needed. \"    Follow-up UC visit today 9/29/2021:    Brazilian translation provided by professional video conferencing service.  Patient reports that overall, symptoms are improving.  No longer has tenderness behind her neck.  Has mild pain when " turning her head left or right.  Has been using acetaminophen and applying warm compresses.  Has not been back to work yet.  No other/new symptoms.   Objective Findings: Constitutional:       General: She is not in acute distress.     Appearance: She is well-developed. She is not ill-appearing or toxic-appearing.   Musculoskeletal:         General: No deformity.      Cervical back: No swelling, deformity, lacerations or tenderness. No spinous process tenderness or muscular tenderness. Decreased range of motion (With lateral rotation due to pain).   Skin:     General: Skin is warm and dry.      Coloration: Skin is not pale.   Neurological:      Mental Status: She is alert and oriented to person, place, and time.      Sensory: No sensory deficit.      Motor: No weakness.      Coordination: Coordination normal.   Pre-Existing Condition(s):     Assessment:   Condition Improved    Status: Additional Care Required  Permanent Disability:No    Plan:      Diagnostics:      Comments:  Condition improving.  May use gentle massage, stretching, and range of motion exercises as tolerated. May apply heat up to 20 minutes at a time. May use over-the-counter acetaminophen alternating with ibuprofen, per 's instructions, for a  dditional pain relief.  Work restrictions.  Follow-up in 5 days.  Seek immediate medical attention if symptoms change or worsen.    Disability Information   Status: Released to Restricted Duty    From:  2021  Through: 10/4/2021 Restrictions are: Temporary   Physical Restrictions   Sitting:    Standing:    Stooping:  < or = to 1 hr/day [place] hrs/day   Squatting:    Walking:    Climbing:    Pushing:      Pulling:    Other:    Reaching Above Shoulder (L):   Reaching Above Shoulder (R):       Reaching Below Shoulder (L):    Reaching Below Shoulder (R):      Not to exceed Weight Limits   Carrying(hrs):   Weight Limit(lb):   Lifting(hrs):   Weight  Limit(lb):     Comments: Pushing, pulling,  lifting limited to 10 pounds    Repetitive Actions   Hands: i.e. Fine Manipulations from Grasping:     Feet: i.e. Operating Foot Controls:     Driving / Operate Machinery:     Health Care Provider’s Original or Electronic Signature  OMARI Hylton Health Care Provider’s Original or Electronic Signature    David Orozco MD         Clinic Name / Location: [place] [address]  [place] [address] Clinic Phone Number: Dept: [phone]   Appointment Time: 9:00 Am Visit Start Time: 9:43 AM   Check-In Time:  8:50 Am Visit Discharge Time:  1058 am    Original-Treating Physician or Chiropractor    Page 2-Insurer/TPA    Page 3-Employer    Page 4-Employee

## 2021-10-04 ENCOUNTER — OCCUPATIONAL MEDICINE (OUTPATIENT)
Dept: URGENT CARE | Facility: CLINIC | Age: 41
End: 2021-10-04
Payer: COMMERCIAL

## 2021-10-04 VITALS
DIASTOLIC BLOOD PRESSURE: 70 MMHG | OXYGEN SATURATION: 98 % | HEART RATE: 74 BPM | TEMPERATURE: 97.7 F | RESPIRATION RATE: 12 BRPM | BODY MASS INDEX: 26.21 KG/M2 | SYSTOLIC BLOOD PRESSURE: 126 MMHG | WEIGHT: 130 LBS | HEIGHT: 59 IN

## 2021-10-04 DIAGNOSIS — S16.1XXD STRAIN OF NECK MUSCLE, SUBSEQUENT ENCOUNTER: ICD-10-CM
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
## 2021-10-04 PROCEDURE — 99214 OFFICE O/P EST MOD 30 MIN: CPT | Performed by: STUDENT IN AN ORGANIZED HEALTH CARE EDUCATION/TRAINING PROGRAM

## 2021-10-04 NOTE — LETTER
[place] [address] [address] [facility] [address] [address]  Phone:  [phone] - Fax:  [phone]   [facility] Progress Report and Disability Certification  Date of Service: 10/4/2021   No Show:  No  Date / Time of Next Visit: 10/11/2021 9:00 AM   Claim Information   Patient Name: Gisele Giraldo  Claim Number:     Employer: [employer]  Date of Injury: 9/26/2021     Insurer / TPA: [employer]  ID / SSN:     Occupation: House keeping  Diagnosis: Diagnoses of Strain of neck muscle, subsequent encounter and Work related injury were pertinent to this visit.    Medical Information   Related to Industrial Injury? No    Subjective Complaints:     This is a new (DOI 9/26/2021) problem. 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 She notes her pain occurred while spreading a sheet while making a bed. Her pain progressively worsened throughout the day.  The problem has been unchanged. The quality of the pain is described as aching. The pain is severe (She notes worsened pain with sitting up straight and standing). Pertinent negatives include no tingling. Associated symptoms comments: Radiating pain to neck. She has tried ice and NSAIDs for the symptoms. The treatment provided moderate relief.   She denies any other employment. She did have an injury to a tendon in her left upper back 3 years ago. This does not feel similar to her previous injury. She does continue to have pain from this previous injury that she medicates with Ibuprofen when needed.     Today's date: 10/4/2021 = third visit  Patient says she is approximately 90% better.  Says she still having very mild discomfort with rotation of the neck but overall the symptoms have improved.  She has been doing home stretches, ibuprofen, Tylenol warm and cold compresses which seemed to help.  Patient has been tolerating light duty.   Objective Findings: Gen: no  acute distress, normal voice  Skin: dry, intact, moist mucosal membranes  Head: Atraumatic, normocephalic  Psych: normal affect, normal judgement, alert, awake  Musculoskeletal: Neck: No erythema, ecchymosis or edema.  Full range of motion without any discernible discomfort.  No point tenderness to palpation.       Pre-Existing Condition(s):     Assessment:   Condition Improved    Status: Additional Care Required  Permanent Disability:No    Plan:      Diagnostics:      Comments:       Disability Information   Status: Released to Restricted Duty    From:  10/4/2021  Through: 10/11/2021 Restrictions are: Temporary   Physical Restrictions   Sitting:    Standing:    Stooping:    Bending:      Squatting:    Walking:    Climbing:    Pushing:      Pulling:    Other:    Reaching Above Shoulder (L):   Reaching Above Shoulder (R):       Reaching Below Shoulder (L):    Reaching Below Shoulder (R):      Not to exceed Weight Limits   Carrying(hrs):   Weight Limit(lb):   Comments:Light duty Lifting(hrs):   Weight  Limit(lb):   Comments:Light duty   Comments: Symptoms approximately 90%.  Suspect she can be discharged at follow-up visit in 1 week  -Provided handout with home stretches and exercises  -Continue ibuprofen/Tylenol as needed  -Light duty; restrictions per D 39  -Follow-up in 1 week    Repetitive Actions   Hands: i.e. Fine Manipulations from Grasping:     Feet: i.e. Operating Foot Controls:     Driving / Operate Machinery:     Health Care Provider’s Original or Electronic Signature  Nasir Sanders D.O. Health Care Provider’s Original or Electronic Signature    David Orozco MD         Clinic Name / Location: [address] [place] [address] Clinic Phone Number: Dept: [phone]   Appointment Time: 9:30 Am Visit Start Time: 9:36 AM   Check-In Time:  9:24 Am Visit Discharge Time:     Original-Treating Physician or Chiropractor    Page 2-Insurer/TPA    Page 3-Employer    Page  4-Employee

## 2021-10-04 NOTE — PROGRESS NOTES
"Subjective:     Gisele Giraldo is a 41 y.o. female who presents for Work-Related Injury ( [facility] DOI: 09-26-21 Neck )         This is a new (DOI 9/26/2021) problem. 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 She notes her pain occurred while spreading a sheet while making a bed. Her pain progressively worsened throughout the day.  The problem has been unchanged. The quality of the pain is described as aching. The pain is severe (She notes worsened pain with sitting up straight and standing). Pertinent negatives include no tingling. Associated symptoms comments: Radiating pain to neck. She has tried ice and NSAIDs for the symptoms. The treatment provided moderate relief.   She denies any other employment. She did have an injury to a tendon in her left upper back 3 years ago. This does not feel similar to her previous injury. She does continue to have pain from this previous injury that she medicates with Ibuprofen when needed.     Today's date: 10/4/2021 = third visit  Patient says she is approximately 90% better.  Says she still having very mild discomfort with rotation of the neck but overall the symptoms have improved.  She has been doing home stretches, ibuprofen, Tylenol warm and cold compresses which seemed to help.  Patient has been tolerating light duty.    PMH:   No pertinent past medical history to this problem  MEDS:  Medications were reviewed in EMR  ALLERGIES:  Allergies were reviewed in EMR  FH:   No pertinent family history to this problem       Objective:     /70   Pulse 74   Temp 36.5 °C (97.7 °F) (Temporal)   Resp 12   Ht 1.499 m (4' 11\")   Wt 59 kg (130 lb)   LMP 09/10/2021   SpO2 98%   BMI 26.26 kg/m²     Gen: no acute distress, normal voice  Skin: dry, intact, moist mucosal membranes  Head: Atraumatic, normocephalic  Psych: normal affect, normal judgement, alert, awake  Musculoskeletal: Neck: No erythema, ecchymosis or edema.  Full range " of motion without any discernible discomfort.  No point tenderness to palpation.        Assessment/Plan:       1. Strain of neck muscle, subsequent encounter    2. Work related injury    • Released to Restricted Duty FROM 10/4/2021 TO 10/11/2021  • Symptoms approximately 90%.  Suspect she can be discharged at follow-up visit in 1 week  • -Provided handout with home stretches and exercises  • -Continue ibuprofen/Tylenol as needed  • -Light duty; restrictions per D 39  • -Follow-up in 1 week  •      Differential diagnosis, natural history, supportive care, and indications for immediate follow-up discussed.

## 2021-10-11 ENCOUNTER — OCCUPATIONAL MEDICINE (OUTPATIENT)
Dept: URGENT CARE | Facility: CLINIC | Age: 41
End: 2021-10-11
Payer: COMMERCIAL

## 2021-10-11 VITALS
BODY MASS INDEX: 27.21 KG/M2 | TEMPERATURE: 97 F | RESPIRATION RATE: 16 BRPM | HEART RATE: 70 BPM | WEIGHT: 135 LBS | DIASTOLIC BLOOD PRESSURE: 72 MMHG | HEIGHT: 59 IN | OXYGEN SATURATION: 99 % | SYSTOLIC BLOOD PRESSURE: 124 MMHG

## 2021-10-11 DIAGNOSIS — S46.919D: ICD-10-CM

## 2021-10-11 DIAGNOSIS — S16.1XXD STRAIN OF NECK MUSCLE, SUBSEQUENT ENCOUNTER: ICD-10-CM

## 2021-10-11 PROCEDURE — 99213 OFFICE O/P EST LOW 20 MIN: CPT | Performed by: PHYSICIAN ASSISTANT

## 2021-10-11 RX ORDER — IBUPROFEN 200 MG
200 TABLET ORAL EVERY 6 HOURS PRN
COMMUNITY

## 2021-10-11 NOTE — LETTER
[place] [id number] [facility] [address] [address]  Phone:  [phone] - Fax:  [phone]   [facility] Progress Report and Disability Certification  Date of Service: 10/11/2021   No Show:  No  Date / Time of Next Visit:     Claim Information   Patient Name: Gisele Giraldo  Claim Number:     Employer: [employer]  Date of Injury: 9/26/2021     Insurer / TPA: [employer]  ID / SSN:     Occupation: House keeping  Diagnosis: Diagnoses of Strain of neck muscle, subsequent encounter and Muscle strain of shoulder region, unspecified laterality, subsequent encounter were pertinent to this visit.    Medical Information   Related to Industrial Injury? Yes    Subjective Complaints:  flakita's date: 9/26/2021.  Today is her fourth visit.  Patient states that her symptoms have resolved.  Her range of motion is normal in her neck and her shoulders.  No numbness or tingling in her arms.  No upper extremity weakness. She continues to do stretches, warm compresses, ibuprofen and Tylenol as needed.  She feels that she is ready to return to full duties.     Objective Findings: No tenderness with palpation of the cervical spine or paracervical muscles.  No palpable step-offs or deformities.  Cervical range of motion within normal limits.  Shoulder range of motion within normal limits, bilaterally.  Upper extremity strength and  strength 5 out of 5 bilaterally.  Upper extremity sensation intact and even bilaterally.     Pre-Existing Condition(s):     Assessment:   Condition Improved    Status: Discharged /  MMI  Permanent Disability:No    Plan:      Diagnostics:      Comments:  Continue Tylenol and ibuprofen as needed.  Warm compresses after work with gentle stretching.  Patient discharged.    Disability Information   Status: Released to Full Duty    From:     Through:   Restrictions are:     Physical Restrictions   Sitting:    Standing:    Stooping:     Bending:      Squatting:    Walking:    Climbing:    Pushing:      Pulling:    Other:    Reaching Above Shoulder (L):   Reaching Above Shoulder (R):       Reaching Below Shoulder (L):    Reaching Below Shoulder (R):      Not to exceed Weight Limits   Carrying(hrs):   Weight Limit(lb):   Lifting(hrs):   Weight  Limit(lb):     Comments:      Repetitive Actions   Hands: i.e. Fine Manipulations from Grasping:     Feet: i.e. Operating Foot Controls:     Driving / Operate Machinery:     Health Care Provider’s Original or Electronic Signature  Mynor Ramsey P.A.-C. Health Care Provider’s Original or Electronic Signature    David Orozco MD         Cl[address] [place] [address] Clinic Phone Number: Dept: [phone]   Appointment Time: 9:00 Am Visit Start Time: 9:24 AM   Check-In Time:  8:53 Am Visit Discharge Time:     Original-Treating Physician or Chiropractor    Page 2-Insurer/TPA    Page 3-Employer    Page 4-Employee

## 2021-10-11 NOTE — PROGRESS NOTES
"Subjective:   Gisele Giraldo is a 41 y.o. female who presents for Follow-Up ([facility] DOI 9- - feeling much better)       Today's date: 9/26/2021.  Today is her fourth visit.  Patient states that her symptoms have resolved.  Her range of motion is normal in her neck and her shoulders.  No numbness or tingling in her arms.  No upper extremity weakness. She continues to do stretches, warm compresses, ibuprofen and Tylenol as needed.  She feels that she is ready to return to full duties.      ROS    PMH:  has no past medical history on file.  MEDS:   Current Outpatient Medications:   •  ibuprofen (MOTRIN) 200 MG Tab, Take 200 mg by mouth every 6 hours as needed., Disp: , Rfl:   ALLERGIES: No Known Allergies  SURGHX: No past surgical history on file.  SOCHX:  reports that she has never smoked. She has never used smokeless tobacco. She reports that she does not drink alcohol and does not use drugs.  FH: Family history was reviewed, no pertinent findings to report   Objective:   /72 (BP Location: Left arm, Patient Position: Sitting, BP Cuff Size: Adult)   Pulse 70   Temp 36.1 °C (97 °F) (Temporal)   Resp 16   Ht 1.5 m (4' 11.06\")   Wt 61.2 kg (135 lb)   SpO2 99%   BMI 27.22 kg/m²   Physical Exam  Musculoskeletal:      Comments: No tenderness with palpation of the cervical spine or paracervical muscles.  No palpable step-offs or deformities.  Cervical range of motion within normal limits.  Shoulder range of motion within normal limits, bilaterally.  Upper extremity strength and  strength 5 out of 5 bilaterally.  Upper extremity sensation intact and even bilaterally.  Radial pulses +2.          Assessment/Plan:   1. Strain of neck muscle, subsequent encounter    2. Muscle strain of shoulder region, unspecified laterality, subsequent encounter    Other orders  - ibuprofen (MOTRIN) 200 MG Tab; Take 200 mg by mouth every 6 hours as needed.    Continue Tylenol and ibuprofen as needed.  Warm " compresses after work with gentle stretching.  Patient discharged.    Differential diagnosis, natural history, supportive care, and indications for immediate follow-up discussed.

## 2024-05-29 ENCOUNTER — HOSPITAL ENCOUNTER (EMERGENCY)
Facility: MEDICAL CENTER | Age: 44
End: 2024-05-29
Attending: STUDENT IN AN ORGANIZED HEALTH CARE EDUCATION/TRAINING PROGRAM
Payer: COMMERCIAL

## 2024-05-29 VITALS
HEART RATE: 67 BPM | BODY MASS INDEX: 29.64 KG/M2 | RESPIRATION RATE: 16 BRPM | SYSTOLIC BLOOD PRESSURE: 125 MMHG | TEMPERATURE: 98.4 F | WEIGHT: 147 LBS | DIASTOLIC BLOOD PRESSURE: 59 MMHG | OXYGEN SATURATION: 98 % | HEIGHT: 59 IN
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
## 2024-05-29 DIAGNOSIS — S46.819A STRAIN OF TRAPEZIUS MUSCLE, UNSPECIFIED LATERALITY, INITIAL ENCOUNTER: ICD-10-CM

## 2024-05-29 DIAGNOSIS — S02.2XXA CLOSED FRACTURE OF NASAL BONE, INITIAL ENCOUNTER: ICD-10-CM

## 2024-05-29 PROCEDURE — 96374 THER/PROPH/DIAG INJ IV PUSH: CPT

## 2024-05-29 PROCEDURE — 700102 HCHG RX REV CODE 250 W/ 637 OVERRIDE(OP): Performed by: STUDENT IN AN ORGANIZED HEALTH CARE EDUCATION/TRAINING PROGRAM

## 2024-05-29 PROCEDURE — 99284 EMERGENCY DEPT VISIT MOD MDM: CPT

## 2024-05-29 PROCEDURE — 700111 HCHG RX REV CODE 636 W/ 250 OVERRIDE (IP): Performed by: STUDENT IN AN ORGANIZED HEALTH CARE EDUCATION/TRAINING PROGRAM

## 2024-05-29 PROCEDURE — A9270 NON-COVERED ITEM OR SERVICE: HCPCS | Performed by: STUDENT IN AN ORGANIZED HEALTH CARE EDUCATION/TRAINING PROGRAM

## 2024-05-29 RX ORDER — ACETAMINOPHEN 325 MG/1
650 TABLET ORAL ONCE
Status: COMPLETED | OUTPATIENT
Start: 2024-05-29 | End: 2024-05-29

## 2024-05-29 RX ORDER — KETOROLAC TROMETHAMINE 15 MG/ML
15 INJECTION, SOLUTION INTRAMUSCULAR; INTRAVENOUS ONCE
Status: COMPLETED | OUTPATIENT
Start: 2024-05-29 | End: 2024-05-29

## 2024-05-29 RX ADMIN — KETOROLAC TROMETHAMINE 15 MG: 15 INJECTION, SOLUTION INTRAMUSCULAR; INTRAVENOUS at 22:39

## 2024-05-29 RX ADMIN — ACETAMINOPHEN 650 MG: 325 TABLET, FILM COATED ORAL at 22:25

## 2024-05-30 NOTE — DISCHARGE INSTRUCTIONS
As we discussed you should take Tylenol 1000 mg and ibuprofen 600 mg every 6 hours for pain.  You will likely be more sore tomorrow than you are now.  Your nose should heal up normally and you do not need any additional treatment however you can apply ice to the area to help with swelling.    You can call the attached number to schedule follow-up appointment with a primary care doctor.
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
## 2024-05-30 NOTE — ED NOTES
Report received, care assumed.  Contact with pt.  Pt sitting up on gurney, C-collar in place.  Pt with c/o forehead/nose pain.  Pt provided with warm blanket.  No other needs expressed at this time.

## 2024-05-30 NOTE — ED NOTES
.Pt stable for discharge. Pt educated and reviewed discharge instructions with RN. Pt verbalized understanding & all questions were answered. Pt AoX 4. Pt ambulated independently with balanced and steady gait out of the ED with all belongings. Pt encouraged to come back if symptoms worsen.    used
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
## 2025-04-30 ENCOUNTER — APPOINTMENT (OUTPATIENT)
Dept: RADIOLOGY | Facility: MEDICAL CENTER | Age: 45
End: 2025-04-30
Attending: EMERGENCY MEDICINE

## 2025-04-30 ENCOUNTER — HOSPITAL ENCOUNTER (EMERGENCY)
Facility: MEDICAL CENTER | Age: 45
End: 2025-04-30
Attending: EMERGENCY MEDICINE

## 2025-04-30 VITALS
RESPIRATION RATE: 16 BRPM | TEMPERATURE: 98.4 F | WEIGHT: 133.6 LBS | BODY MASS INDEX: 26.98 KG/M2 | DIASTOLIC BLOOD PRESSURE: 63 MMHG | OXYGEN SATURATION: 98 % | SYSTOLIC BLOOD PRESSURE: 129 MMHG | HEART RATE: 64 BPM

## 2025-04-30 DIAGNOSIS — S93.402A SPRAIN OF LEFT ANKLE, UNSPECIFIED LIGAMENT, INITIAL ENCOUNTER: ICD-10-CM

## 2025-04-30 PROCEDURE — 73630 X-RAY EXAM OF FOOT: CPT | Mod: LT

## 2025-04-30 PROCEDURE — A9270 NON-COVERED ITEM OR SERVICE: HCPCS

## 2025-04-30 PROCEDURE — 700102 HCHG RX REV CODE 250 W/ 637 OVERRIDE(OP)

## 2025-04-30 PROCEDURE — 29515 APPLICATION SHORT LEG SPLINT: CPT

## 2025-04-30 PROCEDURE — 73610 X-RAY EXAM OF ANKLE: CPT | Mod: LT

## 2025-04-30 PROCEDURE — 99284 EMERGENCY DEPT VISIT MOD MDM: CPT

## 2025-04-30 PROCEDURE — 29125 APPL SHORT ARM SPLINT STATIC: CPT

## 2025-04-30 PROCEDURE — 73590 X-RAY EXAM OF LOWER LEG: CPT | Mod: LT

## 2025-04-30 PROCEDURE — 302874 HCHG BANDAGE ACE 2 OR 3""

## 2025-04-30 RX ORDER — IBUPROFEN 600 MG/1
600 TABLET, FILM COATED ORAL ONCE
Status: COMPLETED | OUTPATIENT
Start: 2025-04-30 | End: 2025-04-30

## 2025-04-30 RX ADMIN — IBUPROFEN 600 MG: 600 TABLET, FILM COATED ORAL at 16:25

## 2025-04-30 ASSESSMENT — PAIN DESCRIPTION - PAIN TYPE: TYPE: ACUTE PAIN

## 2025-04-30 NOTE — ED NOTES
Pt roomed to ortho 1 via wheel chair. Family at bedside. Chart up for ERP. PT medicated per MAR in lobby by triage RN

## 2025-04-30 NOTE — ED TRIAGE NOTES
Chief Complaint   Patient presents with    Ankle Injury     PT fell down 3 stairs yesterday and now her L ankle is swollen and painful.  Bruising and swelling noted ot lateral ankle.       Xray ordered per protocol.  Long bone pain medication protocol also utilized and 600 mg Ibuprofen administered.

## 2025-04-30 NOTE — ED PROVIDER NOTES
ER Provider Note    Scribed for Chelsey Pastrana M.d. by Brianne Noriega. 4/30/2025  4:37 PM    Primary Care Provider: None noted.     CHIEF COMPLAINT   Chief Complaint   Patient presents with    Ankle Injury     PT fell down 3 stairs yesterday and now her L ankle is swollen and painful.  Bruising and swelling noted ot lateral ankle.       EXTERNAL RECORDS REVIEWED  External ED Note Patient was seen at [facility] October 2022 for a viral syndrome.    HPI/ROS  LIMITATION TO HISTORY   Select: Language: Wolof   OUTSIDE HISTORIAN(S):  Family at bedside and translating on patient's behalf.     Gisele Giraldo is a 44 y.o. female who presents to the ED for left ankle pain that started yesterday following a fall. Family reports that patient had been going down the stairs when she accidentally rolled her ankle and fell down the stairs. Following the fall patient was unable to get up and had to be assisted up by family. Patient states that she is able to ambulate but does have pain doing so. Additionally, she is also reporting some foot numbness. Patient denies history of previous injury. She denies knee pain, shin pain, and any other symptoms or injuries at this time      PAST MEDICAL HISTORY  History reviewed. No pertinent past medical history noted.     SURGICAL HISTORY  History reviewed. No pertinent surgical history noted.     FAMILY HISTORY  History reviewed. No pertinent family history noted.     SOCIAL HISTORY   reports that she has never smoked. She has never used smokeless tobacco. She reports that she does not drink alcohol and does not use drugs.    CURRENT MEDICATIONS  Previous Medications    IBUPROFEN (MOTRIN) 200 MG TAB    Take 200 mg by mouth every 6 hours as needed.    METRONIDAZOLE (FLAGYL) 500 MG TAB    Take 1 Tablet by mouth 2 times a day.     ALLERGIES  Patient has no known allergies noted.     PHYSICAL EXAM  BP (!) 140/69   Pulse 79   Temp 36.8 °C (98.2 °F) (Temporal)   Resp 17   Wt 60.6 kg  (133 lb 9.6 oz)   SpO2 98% Comment: RA  BMI 26.98 kg/m²     Constitutional: , Alert in no apparent distress.  HENT: Normocephalic, Bilateral external ears normal. Nose normal.   Eyes: Pupils are equal and reactive. Conjunctiva normal, non-icteric.   Thorax & Lungs: Easy unlabored respirations  Skin: Visualized skin is  Dry, No erythema, No rash.   Extremities:   Left lower extremity: No tenderness to the knee or proximal fibula.  Mild tenderness to the mid calf.  No tenderness to the tibia.  There is some tenderness and swelling to the lateral aspect of the ankle.  Tender over the lateral malleolus.  No tenderness to the medial malleolus.  Mild tenderness to the Achilles.  Achilles appears to be intact.  Nontender calcaneus.  Mild tenderness over the proximal dorsal foot.  Normal sensation to light touch.  2+ pedal pulses.  Full range of motion to digits.  Mild ecchymosis over lateral aspect of ankle.  Skin is intact.  Neurologic: Alert, clear speech  Psychiatric: Affect and Mood normal    DIAGNOSTIC STUDIES    RADIOLOGY/PROCEDURES   The attending emergency physician has independently interpreted the diagnostic imaging associated with this visit and am waiting the final reading from the radiologist.     My preliminary interpretation is a follows: ER MD is reviewed the patient's ankle x-ray.  No obvious fracture.    Radiologist interpretation:  DX-TIBIA AND FIBULA LEFT   Final Result      1.  No fracture of LEFT lower leg.   2.  Lateral ankle swelling.      DX-FOOT-COMPLETE 3+ LEFT   Final Result      No acute osseous abnormality.      DX-ANKLE 3+ VIEWS LEFT   Final Result      1.  No fracture or dislocation of LEFT ankle.   2.  Lateral soft tissue swelling.        COURSE & MEDICAL DECISION MAKING     ASSESSMENT, COURSE AND PLAN  Care Narrative: Patient presents to the ER complaining of left ankle pain after she rolled her ankle while going down some stairs last night.  She is able to weight-bear but it hurts to do  so.  She is predominately tender over the lateral aspect of the ankle.  No complaint of knee pain.  No tenderness to the knee or proximal fibula.  No tenderness at the base of the fifth metatarsal.  There is a mild tenderness over the dorsal aspect of the proximal foot.  She is neurovascular intact.  Skin is intact.  No tenderness over the medial malleolus.  X-ray was performed and is negative for any fracture.  I suspect she has an ankle sprain.  She was placed in a splint and put on crutches as I cannot exclude occult fracture.  She is to ice and elevate.  She has to follow-up with orthopedics.  She is to take over-the-counter Tylenol and ibuprofen for discomfort.  She has been referred to Dr. Sotomayor.  She is to call tomorrow morning to schedule an appointment.  Patient's been given strict return precautions and discharge instructions and she understands treatment plan and follow-up.    4:37 PM - Patient presents to the ED accompanied by family for left ankle pain following fall. I discussed with patient and family plan to obtain imaging and order medication for pain. Patient and family agrees to plan of care. All questions answered at this time.    6:04 PM - Patient was reevaluated at bedside. I discussed radiology  results with the patient/family and  informed them that x-rays looks okay no signs of obvious fracture requiring surgery. I discussed the possibility of a hairline fracture and plan for splint application. I advised patient/family to follow up with orthopedics for further evaluation with repeat imaging to evaluate for fractures. Encouraged patient to use crutches, elevate her foot to help with swelling, and take over the counter medication for pain as needed. Patient was given the opportunity for questions which were answered appropriately.  Patient is to return to the ED for new or worsening or any additional concerns. Patient/family has verbalized understanding and agreement to this plan. Patient  is stable for discharge at this time.          DISPOSITION AND DISCUSSIONS  I have discussed management of the patient with the following physicians and LANETTE's:  None.     Discussion of management with other [facility] or appropriate source(s): None     Escalation of care considered, and ultimately not performed:   No fracture or dislocation.  No need for emergent orthopedic consultation or reduction here in the ER.    Barriers to care at this time, including but not limited to: Patient does not have established PCP.     Decision tools and prescription drugs considered including, but not limited to: Pain Medications patient to take over-the-counter Tylenol and ibuprofen for discomfort. .    DISPOSITION:  Patient will be discharged home in stable condition.    FOLLOW UP:  Nickolas Sotomayor M.D.  [address]  [address]  [facility] [id number] [phone]          FINAL DIAGNOSIS  1. Sprain of left ankle, unspecified ligament, initial encounter Acute        This dictation has been created using voice recognition software. The accuracy of the dictation is limited by the abilities of the software. I expect there may be some errors of grammar and possibly content. I made every attempt to manually correct the errors within my dictation. However, errors related to voice recognition software may still exist and should be interpreted within the appropriate context.     Brianne QUIJANO (Scribe), am scribing for, and in the presence of, Chelsey Pastrana M.D..    Electronically signed by: Brianne Noriega (Scribe), 4/30/2025    Chelsey QUIJANO M.D. personally performed the services described in this documentation, as scribed by Brianne Noriega in my presence, and it is both accurate and complete.      The note accurately reflects work and decisions made by me.  Chelsey Pastrana M.D.  4/30/2025  11:04 PM

## 2025-05-01 NOTE — DISCHARGE INSTRUCTIONS
Return to the ER for any worsening ankle pain, worsening ankle swelling, numbness or tingling to your foot, discoloration to your foot, or for any concerns/worsening.      Take over-the-counter Tylenol and ibuprofen for your pain.    Use ice to help with the swelling.    Elevate your leg is much as possible.  This will help with swelling and pain.    Wear your splint and use crutches until you are seen by orthopedics.    Follow-up with Dr. Sotomayor, orthopedic surgeon, within the next 1 week.  Please call tomorrow to schedule an appointment.